# Patient Record
Sex: MALE | Race: BLACK OR AFRICAN AMERICAN | NOT HISPANIC OR LATINO | Employment: FULL TIME | URBAN - METROPOLITAN AREA
[De-identification: names, ages, dates, MRNs, and addresses within clinical notes are randomized per-mention and may not be internally consistent; named-entity substitution may affect disease eponyms.]

---

## 2017-03-04 ENCOUNTER — HOSPITAL ENCOUNTER (EMERGENCY)
Facility: HOSPITAL | Age: 37
Discharge: HOME | End: 2017-03-04
Attending: EMERGENCY MEDICINE
Payer: COMMERCIAL

## 2017-03-04 VITALS
DIASTOLIC BLOOD PRESSURE: 89 MMHG | BODY MASS INDEX: 26.48 KG/M2 | HEIGHT: 70 IN | TEMPERATURE: 98 F | SYSTOLIC BLOOD PRESSURE: 128 MMHG | WEIGHT: 185 LBS | RESPIRATION RATE: 18 BRPM | HEART RATE: 99 BPM | OXYGEN SATURATION: 99 %

## 2017-03-04 DIAGNOSIS — R11.2 NON-INTRACTABLE VOMITING WITH NAUSEA, UNSPECIFIED VOMITING TYPE: ICD-10-CM

## 2017-03-04 DIAGNOSIS — R74.8 ELEVATED LIVER ENZYMES: ICD-10-CM

## 2017-03-04 DIAGNOSIS — R10.13 ABDOMINAL PAIN, EPIGASTRIC: Primary | ICD-10-CM

## 2017-03-04 DIAGNOSIS — N39.0 ACUTE LOWER UTI (URINARY TRACT INFECTION): ICD-10-CM

## 2017-03-04 LAB
ALBUMIN SERPL BCP-MCNC: 4.6 G/DL
ALP SERPL-CCNC: 118 U/L
ALT SERPL W/O P-5'-P-CCNC: 91 U/L
AMORPH CRY URNS QL MICRO: ABNORMAL
ANION GAP SERPL CALC-SCNC: 15 MMOL/L
AST SERPL-CCNC: 64 U/L
BACTERIA #/AREA URNS HPF: ABNORMAL /HPF
BASOPHILS # BLD AUTO: 0.02 K/UL
BASOPHILS NFR BLD: 0.3 %
BILIRUB SERPL-MCNC: 1.9 MG/DL
BILIRUB UR QL STRIP: NEGATIVE
BUN SERPL-MCNC: 22 MG/DL
CALCIUM SERPL-MCNC: 10.4 MG/DL
CHLORIDE SERPL-SCNC: 99 MMOL/L
CLARITY UR: ABNORMAL
CO2 SERPL-SCNC: 27 MMOL/L
COLOR UR: ABNORMAL
CREAT SERPL-MCNC: 1.4 MG/DL
DIFFERENTIAL METHOD: ABNORMAL
EOSINOPHIL # BLD AUTO: 0 K/UL
EOSINOPHIL NFR BLD: 0.3 %
ERYTHROCYTE [DISTWIDTH] IN BLOOD BY AUTOMATED COUNT: 13.4 %
EST. GFR  (AFRICAN AMERICAN): >60 ML/MIN/1.73 M^2
EST. GFR  (NON AFRICAN AMERICAN): >60 ML/MIN/1.73 M^2
GLUCOSE SERPL-MCNC: 124 MG/DL
GLUCOSE UR QL STRIP: NEGATIVE
HCT VFR BLD AUTO: 43.7 %
HGB BLD-MCNC: 15 G/DL
HGB UR QL STRIP: ABNORMAL
HYALINE CASTS #/AREA URNS LPF: 0 /LPF
KETONES UR QL STRIP: ABNORMAL
LEUKOCYTE ESTERASE UR QL STRIP: ABNORMAL
LIPASE SERPL-CCNC: 16 U/L
LYMPHOCYTES # BLD AUTO: 1.3 K/UL
LYMPHOCYTES NFR BLD: 16.9 %
MCH RBC QN AUTO: 29.5 PG
MCHC RBC AUTO-ENTMCNC: 34.3 %
MCV RBC AUTO: 86 FL
MICROSCOPIC COMMENT: ABNORMAL
MONOCYTES # BLD AUTO: 1.6 K/UL
MONOCYTES NFR BLD: 21.2 %
NEUTROPHILS # BLD AUTO: 4.6 K/UL
NEUTROPHILS NFR BLD: 61 %
NITRITE UR QL STRIP: POSITIVE
PH UR STRIP: 5 [PH] (ref 5–8)
PLATELET # BLD AUTO: 271 K/UL
PMV BLD AUTO: 10.6 FL
POTASSIUM SERPL-SCNC: 3.8 MMOL/L
PROT SERPL-MCNC: 9.3 G/DL
PROT UR QL STRIP: ABNORMAL
RBC # BLD AUTO: 5.09 M/UL
RBC #/AREA URNS HPF: 30 /HPF (ref 0–4)
SODIUM SERPL-SCNC: 141 MMOL/L
SP GR UR STRIP: 1.03 (ref 1–1.03)
SQUAMOUS #/AREA URNS HPF: 3 /HPF
URN SPEC COLLECT METH UR: ABNORMAL
UROBILINOGEN UR STRIP-ACNC: ABNORMAL EU/DL
WBC # BLD AUTO: 7.56 K/UL
WBC #/AREA URNS HPF: 20 /HPF (ref 0–5)

## 2017-03-04 PROCEDURE — 63600175 PHARM REV CODE 636 W HCPCS: Performed by: EMERGENCY MEDICINE

## 2017-03-04 PROCEDURE — 87086 URINE CULTURE/COLONY COUNT: CPT

## 2017-03-04 PROCEDURE — 96365 THER/PROPH/DIAG IV INF INIT: CPT

## 2017-03-04 PROCEDURE — 81000 URINALYSIS NONAUTO W/SCOPE: CPT

## 2017-03-04 PROCEDURE — 99284 EMERGENCY DEPT VISIT MOD MDM: CPT | Mod: 25

## 2017-03-04 PROCEDURE — 63600175 PHARM REV CODE 636 W HCPCS: Performed by: PHYSICIAN ASSISTANT

## 2017-03-04 PROCEDURE — 96375 TX/PRO/DX INJ NEW DRUG ADDON: CPT

## 2017-03-04 PROCEDURE — 96361 HYDRATE IV INFUSION ADD-ON: CPT

## 2017-03-04 PROCEDURE — 80053 COMPREHEN METABOLIC PANEL: CPT

## 2017-03-04 PROCEDURE — 96376 TX/PRO/DX INJ SAME DRUG ADON: CPT

## 2017-03-04 PROCEDURE — 85025 COMPLETE CBC W/AUTO DIFF WBC: CPT

## 2017-03-04 PROCEDURE — 83690 ASSAY OF LIPASE: CPT

## 2017-03-04 PROCEDURE — 25500020 PHARM REV CODE 255: Performed by: EMERGENCY MEDICINE

## 2017-03-04 PROCEDURE — 25000003 PHARM REV CODE 250: Performed by: PHYSICIAN ASSISTANT

## 2017-03-04 RX ORDER — PROMETHAZINE HYDROCHLORIDE 50 MG/1
50 SUPPOSITORY RECTAL DAILY
COMMUNITY

## 2017-03-04 RX ORDER — HYDROMORPHONE HYDROCHLORIDE 2 MG/ML
0.5 INJECTION, SOLUTION INTRAMUSCULAR; INTRAVENOUS; SUBCUTANEOUS
Status: COMPLETED | OUTPATIENT
Start: 2017-03-04 | End: 2017-03-04

## 2017-03-04 RX ORDER — ONDANSETRON 2 MG/ML
4 INJECTION INTRAMUSCULAR; INTRAVENOUS
Status: COMPLETED | OUTPATIENT
Start: 2017-03-04 | End: 2017-03-04

## 2017-03-04 RX ORDER — FAMOTIDINE 10 MG/ML
20 INJECTION INTRAVENOUS
Status: COMPLETED | OUTPATIENT
Start: 2017-03-04 | End: 2017-03-04

## 2017-03-04 RX ORDER — FAMOTIDINE 20 MG/1
20 TABLET, FILM COATED ORAL 2 TIMES DAILY
COMMUNITY

## 2017-03-04 RX ORDER — TRAMADOL HYDROCHLORIDE 50 MG/1
50 TABLET ORAL EVERY 6 HOURS PRN
Qty: 10 TABLET | Refills: 0 | Status: SHIPPED | OUTPATIENT
Start: 2017-03-04

## 2017-03-04 RX ORDER — CIPROFLOXACIN 500 MG/1
500 TABLET ORAL EVERY 12 HOURS
Qty: 20 TABLET | Refills: 0 | Status: SHIPPED | OUTPATIENT
Start: 2017-03-04 | End: 2017-03-14

## 2017-03-04 RX ADMIN — HYDROMORPHONE HYDROCHLORIDE 0.5 MG: 2 INJECTION INTRAMUSCULAR; INTRAVENOUS; SUBCUTANEOUS at 11:03

## 2017-03-04 RX ADMIN — SODIUM CHLORIDE 1000 ML: 0.9 INJECTION, SOLUTION INTRAVENOUS at 11:03

## 2017-03-04 RX ADMIN — SODIUM CHLORIDE 1000 ML: 0.9 INJECTION, SOLUTION INTRAVENOUS at 09:03

## 2017-03-04 RX ADMIN — IOHEXOL 75 ML: 350 INJECTION, SOLUTION INTRAVENOUS at 11:03

## 2017-03-04 RX ADMIN — CEFTRIAXONE 1 G: 1 INJECTION, SOLUTION INTRAVENOUS at 11:03

## 2017-03-04 RX ADMIN — HYDROMORPHONE HYDROCHLORIDE 0.5 MG: 2 INJECTION INTRAMUSCULAR; INTRAVENOUS; SUBCUTANEOUS at 09:03

## 2017-03-04 RX ADMIN — IOHEXOL 15 ML: 300 INJECTION, SOLUTION INTRAVENOUS at 11:03

## 2017-03-04 RX ADMIN — ONDANSETRON 4 MG: 2 INJECTION INTRAMUSCULAR; INTRAVENOUS at 09:03

## 2017-03-04 RX ADMIN — FAMOTIDINE 20 MG: 10 INJECTION, SOLUTION INTRAVENOUS at 09:03

## 2017-03-04 NOTE — DISCHARGE INSTRUCTIONS
The patient is discharged to home.  You are to follow up as directed above.  Avoid Acetaminophen.  Today your BUN is 22 and your Creatinine is 1.4.  Clear fluids for 24 hours, then advance your diet as tolerated.  Avoid: spicy/fatty/greasy foods, alcohol, caffeine, citrus, chocolate, cigarettes, NSAID medications.  Return to the ED for any new or worsening symptoms: fever, increased pain, unable to tolerate oral intake, black or bloody vomit or stool, weakness, dizziness, or any other concerns.

## 2017-03-04 NOTE — ED AVS SNAPSHOT
[facility]  [place] [address]               Arturo Ferraro Jr.   3/4/2017  8:53 AM   ED    Description:  Male : 1980   Department:  [facility]           Your Care was Coordinated By:     Provider Role From To    Maite Wright MD Attending Provider 17 --    FAB Magallon Physician Assistant 17 --      Reason for Visit     loss of appetite     Abdominal Pain           Diagnoses this Visit        Comments    Abdominal pain, epigastric    -  Primary     Non-intractable vomiting with nausea, unspecified vomiting type         Acute lower UTI (urinary tract infection)         Elevated liver enzymes           ED Disposition     None           To Do List           Follow-up Information     Follow up with SHARON Perez MD.    Specialty:  Urology    Why:  Follow up with your urologist within 3 days.  Call to schedule an appointment.    Contact information:     [facility] [address] [phone]          Follow up with Ronan Armstrong MD.    Specialty:  Gastroenterology    Why:  Follow up with gastroenterology within 3 days.  Call to schedule an appointment.    Contact information:    [address] [place] [address] [address]  [address]  [facility]  [place] [id number] [phone]         These Medications        Disp Refills Start End    ciprofloxacin HCl (CIPRO) 500 MG tablet 20 tablet 0 3/4/2017 3/14/2017    Take 1 tablet (500 mg total) by mouth every 12 (twelve) hours. - Oral    tramadol (ULTRAM) 50 mg tablet 10 tablet 0 3/4/2017     Take 1 tablet (50 mg total) by mouth every 6 (six) hours as needed for Pain. - Oral    Notes to Pharmacy: No alcohol, no driving, no swimming, no operating machinery, no working while taking this medication.      [facility] On Call     [facility] On Call Nurse Care Line -  Assistance  Registered nurses in the [facility] On Call Center  provide clinical advisement, health education, appointment booking, and other advisory services.  Call for this free service at [phone].             Medications           Message regarding Medications     Verify the changes and/or additions to your medication regime listed below are the same as discussed with your clinician today.  If any of these changes or additions are incorrect, please notify your healthcare provider.        START taking these NEW medications        Refills    ciprofloxacin HCl (CIPRO) 500 MG tablet 0    Sig: Take 1 tablet (500 mg total) by mouth every 12 (twelve) hours.    Class: Print    Route: Oral    tramadol (ULTRAM) 50 mg tablet 0    Sig: Take 1 tablet (50 mg total) by mouth every 6 (six) hours as needed for Pain.    Class: Print    Route: Oral      These medications were administered today        Dose Freq    sodium chloride 0.9% bolus 1,000 mL 1,000 mL Once    Sig: Inject 1,000 mLs into the vein once.    Class: Normal    Route: Intravenous    hydromorphone (PF) injection 0.5 mg 0.5 mg ED 1 Time    Sig: Inject 0.25 mLs (0.5 mg total) into the vein ED 1 Time.    Class: Normal    Route: Intravenous    ondansetron injection 4 mg 4 mg ED 1 Time    Sig: Inject 4 mg into the vein ED 1 Time.    Class: Normal    Route: Intravenous    famotidine (PF) 20 mg/2 mL injection 20 mg 20 mg ED 1 Time    Sig: Inject 2 mLs (20 mg total) into the vein ED 1 Time.    Class: Normal    Route: Intravenous    cefTRIAXone (ROCEPHIN) 1 g in dextrose 5 % 50 mL IVPB 1 g ED 1 Time    Sig: Inject 50 mLs (1 g total) into the vein ED 1 Time.    Class: Normal    Route: Intravenous    hydromorphone (PF) injection 0.5 mg 0.5 mg ED 1 Time    Sig: Inject 0.25 mLs (0.5 mg total) into the vein ED 1 Time.    Class: Normal    Route: Intravenous    sodium chloride 0.9% bolus 1,000 mL 1,000 mL ED 1 Time    Sig: Inject 1,000 mLs into the vein ED 1 Time.    Class: Normal    Route: Intravenous    omnipaque 300 iohexol 15 mL 15  "mL IMG once as needed    Sig: Take 15 mLs by mouth ONCE PRN for contrast.    Class: Normal    Route: Oral    omnipaque 350 iohexol 75 mL 75 mL IMG once as needed    Sig: Inject 75 mLs into the vein ONCE PRN for contrast.    Class: Normal    Route: Intravenous           Verify that the below list of medications is an accurate representation of the medications you are currently taking.  If none reported, the list may be blank. If incorrect, please contact your healthcare provider. Carry this list with you in case of emergency.           Current Medications     ciprofloxacin HCl (CIPRO) 500 MG tablet Take 1 tablet (500 mg total) by mouth every 12 (twelve) hours.    famotidine (PEPCID) 20 MG tablet Take 20 mg by mouth 2 (two) times daily.    promethazine (PHENERGAN) 50 MG suppository Place 50 mg rectally once daily.    tramadol (ULTRAM) 50 mg tablet Take 1 tablet (50 mg total) by mouth every 6 (six) hours as needed for Pain.           Clinical Reference Information           Your Vitals Were     BP Pulse Temp Resp Height Weight    128/89 (BP Location: Left arm, Patient Position: Sitting, BP Method: Automatic) 99 97.8 °F (36.6 °C) (Oral) 18 5' 10" (1.778 m) 83.9 kg (185 lb)    SpO2 BMI             99% 26.54 kg/m2         Allergies as of 3/4/2017     No Known Allergies      Immunizations Administered on Date of Encounter - 3/4/2017     None      ED Micro, Lab, POCT     Start Ordered       Status Ordering Provider    03/04/17 0928 03/04/17 0927  Urine culture **CANNOT BE ORDERED STAT**  Once      In process     03/04/17 0923 03/04/17 0923  Urinalysis Microscopic  Once      Final result     03/04/17 0922 03/04/17 0923  Urinalysis - Cath.  Once     Comments:  Indwelling suprapubic catheter    Final result     03/04/17 0921 03/04/17 0923  CBC W/ AUTO DIFFERENTIAL  Once      Final result     03/04/17 0921 03/04/17 0923  Comp. Metabolic Panel  STAT      Final result     03/04/17 0921 03/04/17 0923  Lipase  STAT      Final " result       ED Imaging Orders     Start Ordered       Status Ordering Provider    03/04/17 0922 03/04/17 0923  CT Abdomen Pelvis With Contrast  1 time imaging      Final result         Discharge Instructions       The patient is discharged to home.  You are to follow up as directed above.  Avoid Acetaminophen.  Today your BUN is 22 and your Creatinine is 1.4.  Clear fluids for 24 hours, then advance your diet as tolerated.  Avoid: spicy/fatty/greasy foods, alcohol, caffeine, citrus, chocolate, cigarettes, NSAID medications.  Return to the ED for any new or worsening symptoms: fever, increased pain, unable to tolerate oral intake, black or bloody vomit or stool, weakness, dizziness, or any other concerns.    Discharge References/Attachments     BLADDER INFECTION, MALE (ADULT) (ENGLISH)    EPIGASTRIC PAIN (UNCERTAIN CAUSE) (ENGLISH)    VOMITING OR DIARRHEA (ADULT), DIET FOR (ENGLISH)      [employer] Sign-Up     Activating your [facility] account is as easy as 1-2-3!     1) Visit [email or web address], select Sign Up Now, enter this activation code and your date of birth, then select Next.  [email or web address]  Expires: 4/18/2017 12:24 PM      2) Create a username and password to use when you visit [facility] in the future and select a security question in case you lose your password and select Next.    3) Enter your e-mail address and click Sign Up!    Additional Information  If you have questions, please e-mail [email or web address] or call [phone] to talk to our [facility] staff. Remember, [facility] is NOT to be used for urgent needs. For medical emergencies, dial 911.          [facility] complies with applicable Federal civil rights laws and does not discriminate on the basis of race, color, national origin, age, disability, or sex.        Language Assistance Services     ATTENTION: Language assistance services are available, free of charge. Please call [phone].      ATENCIÓN: Kayla casey  tiene a pimentel disposición servicios gratuitos de asistencia lingüística. Llame al [phone].     ALEISHA Ý: N?u b?n nói Ti?ng Vi?t, có các d?ch v? h? tr? ngôn ng? mi?n phí rosalindah cho b?n. G?i s? [phone].

## 2017-03-04 NOTE — ED PROVIDER NOTES
Encounter Date: 3/4/2017       History     Chief Complaint   Patient presents with    loss of appetite     States he hasn't been able to eat anything and his stomach hurts when he lays down     Abdominal Pain     Review of patient's allergies indicates:  No Known Allergies  HPI Comments: Historian:  Patient  CC:  Abdominal pain  HPI:  This 36 year old male presents to the ED complaining of a 4 day history of upper abdominal pain with associated nausea and vomiting.  His pain is 8/10, dull, and waxes and wanes.  He also has associated decreased appetite.  The patient has taken Phenergan and Pepcid without relief.  He has had 4 episodes of vomiting over the past 24 hours.  The patient denies chest pain, shortness of breath, fever, diarrhea, constipation, black or bloody vomit/stool, dysuria, increased urinary frequency, hematuria, and rash.  The patient has had a suprapubic indwelling catheter for the past 3 months.  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    History reviewed. No pertinent past medical history.  Past Surgical History:   Procedure Laterality Date    HERNIA REPAIR       History reviewed. No pertinent family history.  Social History   Substance Use Topics    Smoking status: Never Smoker    Smokeless tobacco: None    Alcohol use Yes     Review of Systems   Constitutional: Negative for fever.   HENT: Negative for trouble swallowing.    Respiratory: Negative for shortness of breath.    Cardiovascular: Negative for chest pain.   Gastrointestinal: Positive for abdominal pain, nausea and vomiting. Negative for blood in stool, constipation and diarrhea.   Genitourinary: Negative for dysuria, frequency and hematuria.   Musculoskeletal: Negative for gait problem.   Skin: Negative for rash.   Allergic/Immunologic: Negative for immunocompromised state.   Neurological: Negative for seizures.   Psychiatric/Behavioral: Negative for confusion.       Physical Exam   Initial  Vitals   BP Pulse Resp Temp SpO2   03/04/17 0833 03/04/17 0833 03/04/17 0833 03/04/17 0833 03/04/17 0833   132/89 110 18 98.1 °F (36.7 °C) 97 %     Physical Exam    Constitutional: He appears well-developed and well-nourished. He is cooperative.  Non-toxic appearance. No distress.   The patient appears to be uncomfortable in pain.   HENT:   Head: Normocephalic and atraumatic.   Mouth/Throat: Mucous membranes are normal. No trismus in the jaw.   Neck: Trachea normal, normal range of motion, full passive range of motion without pain and phonation normal. Neck supple. No stridor present. No rigidity.   Cardiovascular: Regular rhythm and normal heart sounds. Tachycardia present.  Exam reveals no gallop.    Pulmonary/Chest: Effort normal and breath sounds normal. No tachypnea. No respiratory distress. He has no decreased breath sounds. He has no wheezes. He has no rhonchi. He has no rales.   Abdominal: Soft. Normal appearance and bowel sounds are normal. There is tenderness in the epigastric area. There is no rigidity, no rebound, no guarding, no CVA tenderness, no tenderness at McBurney's point and negative Herrera's sign.   +Suprapubic abdominal catheter present - no associated surrounding erythema, warmth, swelling, induration, fluctuance, or tenderness to palpation.   Neurological: He is alert and oriented to person, place, and time. He has normal strength. No sensory deficit.   Skin: Skin is warm, dry and intact. No rash noted.         ED Course   Procedures  Labs Reviewed   CBC W/ AUTO DIFFERENTIAL   COMPREHENSIVE METABOLIC PANEL   LIPASE   URINALYSIS                Additional MDM:   Comments: Patient with a 4 day history of upper abdominal pain with associated nausea and vomiting.  He has not had relief with Phenergan and Pepcid.  The patient is afebrile and nontoxic-appearing.  Patient does have mild epigastric abdominal tenderness to palpation without peritoneal signs.  An IV was inserted and labs were drawn.   Patient was given IV fluids, Zofran, Pepcid, and Dilaudid.  CBC unremarkable - no leukocytosis or significant anemia.  CMP remarkable for mild elevation in glucose at 124, BUN 22, total bili 1.9, AST 64, and ALT 91.  No significant electrolyte imbalance.  This BUN creatinine of 1.4 could be his baseline.  Lipase is within normal limits.  Urinalysis remarkable for 2+ protein, trace ketones, 3+ occult blood, positive nitrite, 2+ leukocytes, 30 RBCs, 20 WBCs, and few bacteria.  This was from an indwelling catheter.  Urine culture is pending.  He was given IV Rocephin in the emergency department.  CT scan of abdomen and pelvis does not demonstrate evidence of galbladder abnormality, bowel obstruction, diverticulitis, appendicitis, bowel perforation.  Patient greatly improved with above care.  There is no right upper quadrant tenderness to palpation.  I doubt acute cholecystitis.  Will treat with Cipro for UTI.  He is to closely follow-up with his urologist and a gastroenterologist.  Will prescribe Ultram.  The patient will be discharged home with supportive care and follow-up as above.  Careful ED warnings and return instructions given.  This patient's case was discussed with Dr. Lozoya, who also evaluated the patient.  She is in agreement with the assessment and plan..            Attending Attestation:     Physician Attestation Statement for NP/PA:   I have conducted a face to face encounter with this patient in addition to the NP/PA, due to NP/PA Request    Other NP/PA Attestation Additions:      Medical Decision Makin y.o. male presents with poor appetite, abdominal discomfort.  He has suprapubic catheter present.  He states that this is his third visit for similar symptoms in the past week.  His pain is more upper abdominal.  CT abdomen and pelvis ordered and this shows no acute findings per radiology read.  He has no leukocytosis.  Hemoglobin and hematocrit are normal.  Creatinine within normal  limits.  Urinalysis appears consistent with infection, however this is a specimen obtained using suprapubic catheter.  Urine culture ordered.  Plan to cover for UTI, however encouraged close follow-up with urology.  Suspect gastritis versus peptic ulcer disease versus GERD.  Doubt acute cholecystitis at this time.  He reports improvement after treatment provided in the emergency department.  I believe he is stable for discharge.  Strict return warnings given.  He states understanding discharge plan and is comfortable going home. I have seen and examined this patient with mid-level provider and agree with physical exam, assessment and plan.                 ED Course     Clinical Impression:   The primary encounter diagnosis was Abdominal pain, epigastric. Diagnoses of Non-intractable vomiting with nausea, unspecified vomiting type, Acute lower UTI (urinary tract infection), and Elevated liver enzymes were also pertinent to this visit.          FAB Magallon  03/04/17 [id number]       Maite Wright MD  03/14/17 [id number]

## 2017-03-06 LAB — BACTERIA UR CULT: NORMAL
